# Patient Record
Sex: MALE | Race: WHITE | NOT HISPANIC OR LATINO | Employment: OTHER | URBAN - NONMETROPOLITAN AREA
[De-identification: names, ages, dates, MRNs, and addresses within clinical notes are randomized per-mention and may not be internally consistent; named-entity substitution may affect disease eponyms.]

---

## 2019-01-21 ENCOUNTER — TRANSCRIBE ORDERS (OUTPATIENT)
Dept: ADMINISTRATIVE | Facility: HOSPITAL | Age: 59
End: 2019-01-21

## 2019-01-21 DIAGNOSIS — M54.5 LOW BACK PAIN, UNSPECIFIED BACK PAIN LATERALITY, UNSPECIFIED CHRONICITY, WITH SCIATICA PRESENCE UNSPECIFIED: Primary | ICD-10-CM

## 2019-01-24 ENCOUNTER — HOSPITAL ENCOUNTER (OUTPATIENT)
Dept: MRI IMAGING | Facility: HOSPITAL | Age: 59
Discharge: HOME | End: 2019-01-24

## 2019-01-24 DIAGNOSIS — M54.5 LOW BACK PAIN, UNSPECIFIED BACK PAIN LATERALITY, UNSPECIFIED CHRONICITY, WITH SCIATICA PRESENCE UNSPECIFIED: ICD-10-CM

## 2019-12-11 ENCOUNTER — OFFICE VISIT (OUTPATIENT)
Dept: SURGERY | Facility: CLINIC | Age: 59
End: 2019-12-11

## 2019-12-11 VITALS
BODY MASS INDEX: 39.68 KG/M2 | HEART RATE: 75 BPM | SYSTOLIC BLOOD PRESSURE: 193 MMHG | WEIGHT: 293 LBS | DIASTOLIC BLOOD PRESSURE: 107 MMHG | HEIGHT: 72 IN

## 2019-12-11 DIAGNOSIS — K40.20 NON-RECURRENT BILATERAL INGUINAL HERNIA WITHOUT OBSTRUCTION OR GANGRENE: Primary | ICD-10-CM

## 2019-12-11 PROBLEM — K40.90 INGUINAL HERNIA WITHOUT OBSTRUCTION OR GANGRENE: Status: ACTIVE | Noted: 2019-12-11

## 2019-12-11 PROCEDURE — 99203 OFFICE O/P NEW LOW 30 MIN: CPT | Performed by: SURGERY

## 2019-12-11 RX ORDER — VALSARTAN AND HYDROCHLOROTHIAZIDE 320; 25 MG/1; MG/1
TABLET, FILM COATED ORAL
Refills: 2 | COMMUNITY
Start: 2019-10-01

## 2019-12-11 NOTE — PROGRESS NOTES
Subjective   Cornell Olivarez is a 59 y.o. male is being seen for consultation today at the request of Jose Pettit MD    Cornell Olivarez is a 59 y.o. male Presenting with fullness in the right inguinal canal and pressure.  No obstructive symptoms.  He has a reducible right inguinal hernia at this location.  No previous hernia repair.  No previous surgery.  Left inguinal hernia present as well but smaller and asymptomatic.  Body mass index is 39.7.      History reviewed. No pertinent past medical history.    Family History   Problem Relation Age of Onset   • Cancer Mother        Social History     Socioeconomic History   • Marital status:      Spouse name: Not on file   • Number of children: Not on file   • Years of education: Not on file   • Highest education level: Not on file   Tobacco Use   • Smoking status: Never Smoker   • Smokeless tobacco: Never Used   Substance and Sexual Activity   • Alcohol use: No   • Drug use: No       History reviewed. No pertinent surgical history.    Review of Systems   Constitutional: Negative for activity change, appetite change, chills and fever.   HENT: Negative for sore throat and trouble swallowing.    Eyes: Negative for visual disturbance.   Respiratory: Negative for cough and shortness of breath.    Cardiovascular: Negative for chest pain and palpitations.   Gastrointestinal: Negative for abdominal distention, abdominal pain, blood in stool, constipation, diarrhea, nausea and vomiting.   Endocrine: Negative for cold intolerance and heat intolerance.   Genitourinary: Negative for dysuria.   Musculoskeletal: Negative for joint swelling.   Skin: Negative for color change, rash and wound.   Allergic/Immunologic: Negative for immunocompromised state.   Neurological: Negative for dizziness, seizures, weakness and headaches.   Hematological: Negative for adenopathy. Does not bruise/bleed easily.   Psychiatric/Behavioral: Negative for agitation and confusion.         BP (!)  "193/107   Pulse 75   Ht 182.9 cm (72.01\")   Wt 133 kg (293 lb)   BMI 39.73 kg/m²   Objective   Physical Exam   Constitutional: He is oriented to person, place, and time. He appears well-developed.   HENT:   Head: Normocephalic and atraumatic.   Mouth/Throat: Mucous membranes are normal.   Eyes: Pupils are equal, round, and reactive to light. Conjunctivae are normal.   Neck: Neck supple. No JVD present. No tracheal deviation present. No thyromegaly present.   Cardiovascular: Normal rate and regular rhythm. Exam reveals no gallop and no friction rub.   No murmur heard.  Pulmonary/Chest: Effort normal and breath sounds normal.   Abdominal: Soft. He exhibits no distension. There is no splenomegaly or hepatomegaly. There is no tenderness. A hernia is present.   Right inguinal hernia, left inguinal hernia   Musculoskeletal: Normal range of motion. He exhibits no deformity.   Neurological: He is alert and oriented to person, place, and time.   Skin: Skin is warm and dry.   Psychiatric: He has a normal mood and affect.             Assessment   Cornell was seen today for hernia.    Diagnoses and all orders for this visit:    Non-recurrent bilateral inguinal hernia without obstruction or gangrene      Cornell Olivarez is a 59 y.o. male with full bilateral inguinal hernia right greater than left.  The risk and benefits of surgery have been discussed with the patient and he would like to perform surgery at a time when he can better accommodate the lifting restriction.  Given the reducible nature of his hernias watchful waiting is an appropriate mode of therapy.  The patient has been counseled that his current body mass index of 39.7 while below 40 is the cutoff for surgery weight loss has been recommended.  If he has a BMI over 40 elective repair will not be performed.    Patient's Body mass index is 39.73 kg/m². BMI is above normal parameters. Recommendations include: educational material.             "

## 2022-08-30 ENCOUNTER — OFFICE VISIT (OUTPATIENT)
Dept: SURGERY | Facility: CLINIC | Age: 62
End: 2022-08-30

## 2022-08-30 VITALS — HEIGHT: 72 IN | BODY MASS INDEX: 39.68 KG/M2 | WEIGHT: 293 LBS

## 2022-08-30 DIAGNOSIS — K40.90 INGUINAL HERNIA WITHOUT OBSTRUCTION OR GANGRENE, RECURRENCE NOT SPECIFIED, UNSPECIFIED LATERALITY: Primary | ICD-10-CM

## 2022-08-30 PROCEDURE — 99213 OFFICE O/P EST LOW 20 MIN: CPT | Performed by: SURGERY

## 2022-08-31 NOTE — PROGRESS NOTES
"Subjective   Cornell Olivarez is a 62 y.o. male is being seen for consultation today at the request of Jose Pettit MD    Cornell Olivarez is a 62 y.o. male With unilateral inguinal hernia causing discomfort.  Symptoms are greatest on the left.  Possible bilateral hernias noted last examination.  No anticoagulation reported.  No previous abdominal surgery.  Body mass index 39.7        History reviewed. No pertinent past medical history.    Family History   Problem Relation Age of Onset   • Cancer Mother        Social History     Socioeconomic History   • Marital status:    Tobacco Use   • Smoking status: Never Smoker   • Smokeless tobacco: Never Used   Vaping Use   • Vaping Use: Never used   Substance and Sexual Activity   • Alcohol use: No   • Drug use: No   • Sexual activity: Defer       History reviewed. No pertinent surgical history.    Review of Systems   Constitutional: Negative for activity change, appetite change, chills and fever.   HENT: Negative for sore throat and trouble swallowing.    Eyes: Negative for visual disturbance.   Respiratory: Negative for cough and shortness of breath.    Cardiovascular: Negative for chest pain and palpitations.   Gastrointestinal: Negative for abdominal distention, abdominal pain, blood in stool, constipation, diarrhea, nausea and vomiting.   Endocrine: Negative for cold intolerance and heat intolerance.   Genitourinary: Negative for dysuria.   Musculoskeletal: Negative for joint swelling.   Skin: Negative for color change, rash and wound.   Allergic/Immunologic: Negative for immunocompromised state.   Neurological: Negative for dizziness, seizures, weakness and headaches.   Hematological: Negative for adenopathy. Does not bruise/bleed easily.   Psychiatric/Behavioral: Negative for agitation and confusion.         Ht 182.9 cm (72.01\")   Wt 133 kg (293 lb)   BMI 39.73 kg/m²   Objective   Physical Exam  Constitutional:       Appearance: He is well-developed. "   HENT:      Head: Normocephalic and atraumatic.   Eyes:      Conjunctiva/sclera: Conjunctivae normal.      Pupils: Pupils are equal, round, and reactive to light.   Neck:      Thyroid: No thyromegaly.      Vascular: No JVD.      Trachea: No tracheal deviation.   Cardiovascular:      Rate and Rhythm: Normal rate and regular rhythm.      Heart sounds: No murmur heard.    No friction rub. No gallop.   Pulmonary:      Effort: Pulmonary effort is normal.      Breath sounds: Normal breath sounds.   Abdominal:      General: There is no distension.      Palpations: Abdomen is soft. There is no hepatomegaly or splenomegaly.      Tenderness: There is no abdominal tenderness.      Hernia: A hernia is present.   Musculoskeletal:         General: No deformity. Normal range of motion.      Cervical back: Neck supple.   Skin:     General: Skin is warm and dry.   Neurological:      Mental Status: He is alert and oriented to person, place, and time.               Assessment   Diagnoses and all orders for this visit:    1. Inguinal hernia without obstruction or gangrene, recurrence not specified, unspecified laterality (Primary)  -     Case Request; Standing  -     Case Request    Other orders  -     Follow anesthesia standing orders.  -     Provide NPO Instructions to Patient; Future  -     Chlorhexidine Skin Prep; Future      Cornell Olivarez is a 62 y.o. male with symptomatic unilateral inguinal hernia possible bilateral.  Risks and benefits discussed with the patient he will proceed with robotic inguinal hernia repair.    Class 2 Severe Obesity (BMI >=35 and <=39.9). Obesity-related health conditions include the following: none. Obesity is unchanged. BMI is is above average; BMI management plan is completed. We discussed portion control and increasing exercise.

## 2022-10-04 ENCOUNTER — TELEPHONE (OUTPATIENT)
Dept: SURGERY | Facility: CLINIC | Age: 62
End: 2022-10-04

## 2022-10-04 NOTE — TELEPHONE ENCOUNTER
Left message for patient to return my call about his upcoming surgery with .     DL 10/4/22 11:16am

## 2022-10-06 ENCOUNTER — TELEPHONE (OUTPATIENT)
Dept: SURGERY | Facility: CLINIC | Age: 62
End: 2022-10-06

## 2022-10-06 NOTE — TELEPHONE ENCOUNTER
Pre-op Date/Time: 10/11 @12:15    Surgery Date/Time: 10/13 @ 6:30       NPO AFTER MIDNIGHT AND MUST HAVE A  WITH THE MORNING Of       DL 10/6/22 10:25am

## 2022-10-10 NOTE — DISCHARGE INSTRUCTIONS

## 2022-10-11 ENCOUNTER — PRE-ADMISSION TESTING (OUTPATIENT)
Dept: PREADMISSION TESTING | Facility: HOSPITAL | Age: 62
End: 2022-10-11

## 2022-10-11 LAB
ANION GAP SERPL CALCULATED.3IONS-SCNC: 11.8 MMOL/L (ref 5–15)
BUN SERPL-MCNC: 16 MG/DL (ref 8–23)
BUN/CREAT SERPL: 14 (ref 7–25)
CALCIUM SPEC-SCNC: 9.5 MG/DL (ref 8.6–10.5)
CHLORIDE SERPL-SCNC: 102 MMOL/L (ref 98–107)
CO2 SERPL-SCNC: 26.2 MMOL/L (ref 22–29)
CREAT SERPL-MCNC: 1.14 MG/DL (ref 0.76–1.27)
DEPRECATED RDW RBC AUTO: 43.3 FL (ref 37–54)
EGFRCR SERPLBLD CKD-EPI 2021: 72.7 ML/MIN/1.73
ERYTHROCYTE [DISTWIDTH] IN BLOOD BY AUTOMATED COUNT: 12.5 % (ref 12.3–15.4)
GLUCOSE SERPL-MCNC: 110 MG/DL (ref 65–99)
HCT VFR BLD AUTO: 43.9 % (ref 37.5–51)
HGB BLD-MCNC: 14.6 G/DL (ref 13–17.7)
MCH RBC QN AUTO: 31.3 PG (ref 26.6–33)
MCHC RBC AUTO-ENTMCNC: 33.3 G/DL (ref 31.5–35.7)
MCV RBC AUTO: 94.2 FL (ref 79–97)
PLATELET # BLD AUTO: 230 10*3/MM3 (ref 140–450)
PMV BLD AUTO: 11.1 FL (ref 6–12)
POTASSIUM SERPL-SCNC: 4 MMOL/L (ref 3.5–5.2)
RBC # BLD AUTO: 4.66 10*6/MM3 (ref 4.14–5.8)
SODIUM SERPL-SCNC: 140 MMOL/L (ref 136–145)
WBC NRBC COR # BLD: 7.7 10*3/MM3 (ref 3.4–10.8)

## 2022-10-11 PROCEDURE — 93010 ELECTROCARDIOGRAM REPORT: CPT | Performed by: INTERNAL MEDICINE

## 2022-10-11 PROCEDURE — 93005 ELECTROCARDIOGRAM TRACING: CPT

## 2022-10-11 PROCEDURE — 80048 BASIC METABOLIC PNL TOTAL CA: CPT

## 2022-10-11 PROCEDURE — 36415 COLL VENOUS BLD VENIPUNCTURE: CPT

## 2022-10-11 PROCEDURE — 85027 COMPLETE CBC AUTOMATED: CPT

## 2022-10-12 LAB
QT INTERVAL: 406 MS
QTC INTERVAL: 425 MS

## 2022-10-13 ENCOUNTER — ANESTHESIA EVENT (OUTPATIENT)
Dept: PERIOP | Facility: HOSPITAL | Age: 62
End: 2022-10-13

## 2022-10-13 ENCOUNTER — ANESTHESIA (OUTPATIENT)
Dept: PERIOP | Facility: HOSPITAL | Age: 62
End: 2022-10-13

## 2022-10-13 ENCOUNTER — HOSPITAL ENCOUNTER (OUTPATIENT)
Facility: HOSPITAL | Age: 62
Setting detail: HOSPITAL OUTPATIENT SURGERY
Discharge: HOME | End: 2022-10-13
Attending: SURGERY | Admitting: SURGERY

## 2022-10-13 VITALS
HEART RATE: 70 BPM | DIASTOLIC BLOOD PRESSURE: 66 MMHG | WEIGHT: 278 LBS | HEIGHT: 72 IN | SYSTOLIC BLOOD PRESSURE: 120 MMHG | BODY MASS INDEX: 37.65 KG/M2 | TEMPERATURE: 97.8 F | RESPIRATION RATE: 18 BRPM | OXYGEN SATURATION: 95 %

## 2022-10-13 DIAGNOSIS — K40.90 INGUINAL HERNIA WITHOUT OBSTRUCTION OR GANGRENE, RECURRENCE NOT SPECIFIED, UNSPECIFIED LATERALITY: ICD-10-CM

## 2022-10-13 DIAGNOSIS — K40.20 NON-RECURRENT BILATERAL INGUINAL HERNIA WITHOUT OBSTRUCTION OR GANGRENE: Primary | ICD-10-CM

## 2022-10-13 LAB — GLUCOSE BLDC GLUCOMTR-MCNC: 165 MG/DL (ref 70–130)

## 2022-10-13 PROCEDURE — 25010000002 DEXAMETHASONE PER 1 MG: Performed by: ANESTHESIOLOGY

## 2022-10-13 PROCEDURE — S0260 H&P FOR SURGERY: HCPCS | Performed by: SURGERY

## 2022-10-13 PROCEDURE — 25010000002 KETOROLAC TROMETHAMINE PER 15 MG: Performed by: NURSE ANESTHETIST, CERTIFIED REGISTERED

## 2022-10-13 PROCEDURE — 25010000002 NEOSTIGMINE 10 MG/10ML SOLUTION: Performed by: NURSE ANESTHETIST, CERTIFIED REGISTERED

## 2022-10-13 PROCEDURE — 25010000002 BUPRENORPHINE PER 0.1 MG: Performed by: ANESTHESIOLOGY

## 2022-10-13 PROCEDURE — 25010000002 MIDAZOLAM PER 1 MG: Performed by: NURSE ANESTHETIST, CERTIFIED REGISTERED

## 2022-10-13 PROCEDURE — 49650 LAP ING HERNIA REPAIR INIT: CPT | Performed by: SURGERY

## 2022-10-13 PROCEDURE — 25010000002 CEFAZOLIN PER 500 MG: Performed by: SURGERY

## 2022-10-13 PROCEDURE — 25010000002 FENTANYL CITRATE (PF) 50 MCG/ML SOLUTION: Performed by: NURSE ANESTHETIST, CERTIFIED REGISTERED

## 2022-10-13 PROCEDURE — 25010000002 DROPERIDOL PER 5 MG: Performed by: NURSE ANESTHETIST, CERTIFIED REGISTERED

## 2022-10-13 PROCEDURE — 82962 GLUCOSE BLOOD TEST: CPT

## 2022-10-13 PROCEDURE — C1781 MESH (IMPLANTABLE): HCPCS | Performed by: SURGERY

## 2022-10-13 PROCEDURE — 25010000002 ROPIVACAINE PER 1 MG: Performed by: ANESTHESIOLOGY

## 2022-10-13 PROCEDURE — 25010000002 ONDANSETRON PER 1 MG: Performed by: NURSE ANESTHETIST, CERTIFIED REGISTERED

## 2022-10-13 PROCEDURE — 94640 AIRWAY INHALATION TREATMENT: CPT

## 2022-10-13 PROCEDURE — 25010000002 PROPOFOL 200 MG/20ML EMULSION: Performed by: NURSE ANESTHETIST, CERTIFIED REGISTERED

## 2022-10-13 DEVICE — 3DMAX™ MID ANATOMICAL MESH, LARGE, LEFT, 4" X 6", 10 X 16 CM
Type: IMPLANTABLE DEVICE | Site: INGUINAL | Status: FUNCTIONAL
Brand: 3DMAX™ MID ANATOMICAL MESH

## 2022-10-13 DEVICE — ABSORBABLE WOUND CLOSURE DEVICE
Type: IMPLANTABLE DEVICE | Site: INGUINAL | Status: FUNCTIONAL
Brand: V-LOC 90

## 2022-10-13 DEVICE — 3DMAX™ MID ANATOMICAL MESH, LARGE, RIGHT, 4” X 6”, 10 X 16 CM
Type: IMPLANTABLE DEVICE | Site: INGUINAL | Status: FUNCTIONAL
Brand: 3DMAX™ MID ANATOMICAL MESH

## 2022-10-13 RX ORDER — SODIUM CHLORIDE 0.9 % (FLUSH) 0.9 %
10 SYRINGE (ML) INJECTION AS NEEDED
Status: DISCONTINUED | OUTPATIENT
Start: 2022-10-13 | End: 2022-10-13 | Stop reason: HOSPADM

## 2022-10-13 RX ORDER — FENTANYL CITRATE 50 UG/ML
50 INJECTION, SOLUTION INTRAMUSCULAR; INTRAVENOUS
Status: DISCONTINUED | OUTPATIENT
Start: 2022-10-13 | End: 2022-10-13 | Stop reason: HOSPADM

## 2022-10-13 RX ORDER — TRAMADOL HYDROCHLORIDE 50 MG/1
50 TABLET ORAL EVERY 8 HOURS PRN
Qty: 10 TABLET | Refills: 0 | Status: SHIPPED | OUTPATIENT
Start: 2022-10-13

## 2022-10-13 RX ORDER — MIDAZOLAM HYDROCHLORIDE 1 MG/ML
1 INJECTION INTRAMUSCULAR; INTRAVENOUS
Status: DISCONTINUED | OUTPATIENT
Start: 2022-10-13 | End: 2022-10-13 | Stop reason: HOSPADM

## 2022-10-13 RX ORDER — FENTANYL CITRATE 50 UG/ML
INJECTION, SOLUTION INTRAMUSCULAR; INTRAVENOUS AS NEEDED
Status: DISCONTINUED | OUTPATIENT
Start: 2022-10-13 | End: 2022-10-13 | Stop reason: SURG

## 2022-10-13 RX ORDER — SODIUM CHLORIDE, SODIUM LACTATE, POTASSIUM CHLORIDE, CALCIUM CHLORIDE 600; 310; 30; 20 MG/100ML; MG/100ML; MG/100ML; MG/100ML
125 INJECTION, SOLUTION INTRAVENOUS ONCE
Status: COMPLETED | OUTPATIENT
Start: 2022-10-13 | End: 2022-10-13

## 2022-10-13 RX ORDER — SODIUM CHLORIDE 0.9 % (FLUSH) 0.9 %
10 SYRINGE (ML) INJECTION EVERY 12 HOURS SCHEDULED
Status: DISCONTINUED | OUTPATIENT
Start: 2022-10-13 | End: 2022-10-13 | Stop reason: HOSPADM

## 2022-10-13 RX ORDER — ONDANSETRON 2 MG/ML
INJECTION INTRAMUSCULAR; INTRAVENOUS AS NEEDED
Status: DISCONTINUED | OUTPATIENT
Start: 2022-10-13 | End: 2022-10-13 | Stop reason: SURG

## 2022-10-13 RX ORDER — LIDOCAINE HYDROCHLORIDE 20 MG/ML
INJECTION, SOLUTION EPIDURAL; INFILTRATION; INTRACAUDAL; PERINEURAL AS NEEDED
Status: DISCONTINUED | OUTPATIENT
Start: 2022-10-13 | End: 2022-10-13 | Stop reason: SURG

## 2022-10-13 RX ORDER — OXYCODONE HYDROCHLORIDE AND ACETAMINOPHEN 5; 325 MG/1; MG/1
1 TABLET ORAL ONCE AS NEEDED
Status: COMPLETED | OUTPATIENT
Start: 2022-10-13 | End: 2022-10-13

## 2022-10-13 RX ORDER — DROPERIDOL 2.5 MG/ML
0.62 INJECTION, SOLUTION INTRAMUSCULAR; INTRAVENOUS ONCE
Status: COMPLETED | OUTPATIENT
Start: 2022-10-13 | End: 2022-10-13

## 2022-10-13 RX ORDER — NEOSTIGMINE METHYLSULFATE 1 MG/ML
INJECTION, SOLUTION INTRAVENOUS AS NEEDED
Status: DISCONTINUED | OUTPATIENT
Start: 2022-10-13 | End: 2022-10-13 | Stop reason: SURG

## 2022-10-13 RX ORDER — MIDAZOLAM HYDROCHLORIDE 1 MG/ML
INJECTION INTRAMUSCULAR; INTRAVENOUS AS NEEDED
Status: DISCONTINUED | OUTPATIENT
Start: 2022-10-13 | End: 2022-10-13 | Stop reason: SURG

## 2022-10-13 RX ORDER — BUPRENORPHINE HYDROCHLORIDE 0.32 MG/ML
INJECTION INTRAMUSCULAR; INTRAVENOUS
Status: COMPLETED | OUTPATIENT
Start: 2022-10-13 | End: 2022-10-13

## 2022-10-13 RX ORDER — SODIUM CHLORIDE, SODIUM LACTATE, POTASSIUM CHLORIDE, CALCIUM CHLORIDE 600; 310; 30; 20 MG/100ML; MG/100ML; MG/100ML; MG/100ML
INJECTION, SOLUTION INTRAVENOUS CONTINUOUS PRN
Status: DISCONTINUED | OUTPATIENT
Start: 2022-10-13 | End: 2022-10-13 | Stop reason: SURG

## 2022-10-13 RX ORDER — PROPOFOL 10 MG/ML
INJECTION, EMULSION INTRAVENOUS AS NEEDED
Status: DISCONTINUED | OUTPATIENT
Start: 2022-10-13 | End: 2022-10-13 | Stop reason: SURG

## 2022-10-13 RX ORDER — SODIUM CHLORIDE, SODIUM LACTATE, POTASSIUM CHLORIDE, CALCIUM CHLORIDE 600; 310; 30; 20 MG/100ML; MG/100ML; MG/100ML; MG/100ML
100 INJECTION, SOLUTION INTRAVENOUS ONCE AS NEEDED
Status: DISCONTINUED | OUTPATIENT
Start: 2022-10-13 | End: 2022-10-13 | Stop reason: HOSPADM

## 2022-10-13 RX ORDER — VECURONIUM BROMIDE 1 MG/ML
INJECTION, POWDER, LYOPHILIZED, FOR SOLUTION INTRAVENOUS AS NEEDED
Status: DISCONTINUED | OUTPATIENT
Start: 2022-10-13 | End: 2022-10-13 | Stop reason: SURG

## 2022-10-13 RX ORDER — MEPERIDINE HYDROCHLORIDE 25 MG/ML
12.5 INJECTION INTRAMUSCULAR; INTRAVENOUS; SUBCUTANEOUS
Status: DISCONTINUED | OUTPATIENT
Start: 2022-10-13 | End: 2022-10-13 | Stop reason: HOSPADM

## 2022-10-13 RX ORDER — MAGNESIUM HYDROXIDE 1200 MG/15ML
LIQUID ORAL AS NEEDED
Status: DISCONTINUED | OUTPATIENT
Start: 2022-10-13 | End: 2022-10-13 | Stop reason: HOSPADM

## 2022-10-13 RX ORDER — FAMOTIDINE 10 MG/ML
INJECTION, SOLUTION INTRAVENOUS AS NEEDED
Status: DISCONTINUED | OUTPATIENT
Start: 2022-10-13 | End: 2022-10-13 | Stop reason: SURG

## 2022-10-13 RX ORDER — ONDANSETRON 2 MG/ML
4 INJECTION INTRAMUSCULAR; INTRAVENOUS AS NEEDED
Status: DISCONTINUED | OUTPATIENT
Start: 2022-10-13 | End: 2022-10-13 | Stop reason: HOSPADM

## 2022-10-13 RX ORDER — GLYCOPYRROLATE 0.2 MG/ML
INJECTION INTRAMUSCULAR; INTRAVENOUS AS NEEDED
Status: DISCONTINUED | OUTPATIENT
Start: 2022-10-13 | End: 2022-10-13 | Stop reason: SURG

## 2022-10-13 RX ORDER — DEXAMETHASONE SODIUM PHOSPHATE 4 MG/ML
INJECTION, SOLUTION INTRA-ARTICULAR; INTRALESIONAL; INTRAMUSCULAR; INTRAVENOUS; SOFT TISSUE
Status: COMPLETED | OUTPATIENT
Start: 2022-10-13 | End: 2022-10-13

## 2022-10-13 RX ORDER — IPRATROPIUM BROMIDE AND ALBUTEROL SULFATE 2.5; .5 MG/3ML; MG/3ML
3 SOLUTION RESPIRATORY (INHALATION) ONCE AS NEEDED
Status: COMPLETED | OUTPATIENT
Start: 2022-10-13 | End: 2022-10-13

## 2022-10-13 RX ORDER — KETOROLAC TROMETHAMINE 30 MG/ML
30 INJECTION, SOLUTION INTRAMUSCULAR; INTRAVENOUS EVERY 6 HOURS PRN
Status: COMPLETED | OUTPATIENT
Start: 2022-10-13 | End: 2022-10-13

## 2022-10-13 RX ORDER — IBUPROFEN 600 MG/1
600 TABLET ORAL EVERY 6 HOURS PRN
Qty: 30 TABLET | Refills: 0 | Status: SHIPPED | OUTPATIENT
Start: 2022-10-13

## 2022-10-13 RX ORDER — ROPIVACAINE HYDROCHLORIDE 5 MG/ML
INJECTION, SOLUTION EPIDURAL; INFILTRATION; PERINEURAL
Status: COMPLETED | OUTPATIENT
Start: 2022-10-13 | End: 2022-10-13

## 2022-10-13 RX ORDER — ACETAMINOPHEN 325 MG/1
650 TABLET ORAL EVERY 4 HOURS PRN
Qty: 30 TABLET | Refills: 0 | Status: SHIPPED | OUTPATIENT
Start: 2022-10-13

## 2022-10-13 RX ADMIN — BUPRENORPHINE HYDROCHLORIDE 0.3 MG: 0.32 INJECTION INTRAMUSCULAR; INTRAVENOUS at 07:46

## 2022-10-13 RX ADMIN — OXYCODONE HYDROCHLORIDE AND ACETAMINOPHEN 1 TABLET: 5; 325 TABLET ORAL at 10:31

## 2022-10-13 RX ADMIN — GLYCOPYRROLATE 0.4 MG: 0.4 INJECTION INTRAMUSCULAR; INTRAVENOUS at 08:59

## 2022-10-13 RX ADMIN — VECURONIUM BROMIDE 2 MG: 1 INJECTION, POWDER, LYOPHILIZED, FOR SOLUTION INTRAVENOUS at 08:12

## 2022-10-13 RX ADMIN — IPRATROPIUM BROMIDE AND ALBUTEROL SULFATE 3 ML: .5; 3 SOLUTION RESPIRATORY (INHALATION) at 10:09

## 2022-10-13 RX ADMIN — KETOROLAC TROMETHAMINE 30 MG: 30 INJECTION, SOLUTION INTRAMUSCULAR at 09:54

## 2022-10-13 RX ADMIN — SODIUM CHLORIDE, POTASSIUM CHLORIDE, SODIUM LACTATE AND CALCIUM CHLORIDE: 600; 310; 30; 20 INJECTION, SOLUTION INTRAVENOUS at 07:29

## 2022-10-13 RX ADMIN — DEXAMETHASONE SODIUM PHOSPHATE 8 MG: 4 INJECTION, SOLUTION INTRA-ARTICULAR; INTRALESIONAL; INTRAMUSCULAR; INTRAVENOUS; SOFT TISSUE at 07:46

## 2022-10-13 RX ADMIN — SODIUM CHLORIDE, POTASSIUM CHLORIDE, SODIUM LACTATE AND CALCIUM CHLORIDE 125 ML/HR: 600; 310; 30; 20 INJECTION, SOLUTION INTRAVENOUS at 07:06

## 2022-10-13 RX ADMIN — ROPIVACAINE HYDROCHLORIDE 300 MG: 5 INJECTION, SOLUTION EPIDURAL; INFILTRATION; PERINEURAL at 07:46

## 2022-10-13 RX ADMIN — LIDOCAINE HYDROCHLORIDE 40 MG: 20 INJECTION, SOLUTION EPIDURAL; INFILTRATION; INTRACAUDAL; PERINEURAL at 07:34

## 2022-10-13 RX ADMIN — FAMOTIDINE 20 MG: 10 INJECTION, SOLUTION INTRAVENOUS at 07:29

## 2022-10-13 RX ADMIN — FENTANYL CITRATE 100 MCG: 50 INJECTION INTRAMUSCULAR; INTRAVENOUS at 07:34

## 2022-10-13 RX ADMIN — NEOSTIGMINE METHYLSULFATE 3 MG: 0.5 INJECTION INTRAVENOUS at 08:59

## 2022-10-13 RX ADMIN — ONDANSETRON 4 MG: 2 INJECTION INTRAMUSCULAR; INTRAVENOUS at 08:59

## 2022-10-13 RX ADMIN — MIDAZOLAM HYDROCHLORIDE 2 MG: 1 INJECTION, SOLUTION INTRAMUSCULAR; INTRAVENOUS at 07:29

## 2022-10-13 RX ADMIN — VECURONIUM BROMIDE 5 MG: 1 INJECTION, POWDER, LYOPHILIZED, FOR SOLUTION INTRAVENOUS at 07:34

## 2022-10-13 RX ADMIN — DROPERIDOL 0.62 MG: 2.5 INJECTION, SOLUTION INTRAMUSCULAR; INTRAVENOUS at 09:19

## 2022-10-13 RX ADMIN — PROPOFOL 200 MG: 10 INJECTION, EMULSION INTRAVENOUS at 07:34

## 2022-10-13 RX ADMIN — CEFAZOLIN 3 G: 1 INJECTION, POWDER, FOR SOLUTION INTRAVENOUS at 07:39

## 2022-10-13 NOTE — ANESTHESIA POSTPROCEDURE EVALUATION
Patient: Cornell Olivarez    Procedure Summary     Date: 10/13/22 Room / Location: [facility] OR [address] [place] [address] COR OR    Anesthesia Start: 0729 Anesthesia Stop: 0913    Procedure: INGUINAL HERNIA REPAIR LAPAROSCOPIC WITH DAVINCI ROBOT (Bilateral: Abdomen) Diagnosis:       Inguinal hernia without obstruction or gangrene, recurrence not specified, unspecified laterality      (Inguinal hernia without obstruction or gangrene, recurrence not specified, unspecified laterality [K40.90])    Surgeons: Ramiro Fitzpatrick MD Provider: Ildefonso Vlale MD    Anesthesia Type: general with block ASA Status: 3          Anesthesia Type: general with block    Vitals  Vitals Value Taken Time   /58 10/13/22 1019   Temp 97.8 °F (36.6 °C) 10/13/22 0949   Pulse 63 10/13/22 1019   Resp 14 10/13/22 1019   SpO2 94 % 10/13/22 1019           Post Anesthesia Care and Evaluation    Patient location during evaluation: PACU  Patient participation: complete - patient participated  Level of consciousness: awake  Pain score: 0  Pain management: adequate    Airway patency: patent  Anesthetic complications: No anesthetic complications  PONV Status: none  Cardiovascular status: hemodynamically stable  Respiratory status: nasal cannula  Hydration status: acceptable

## 2022-10-13 NOTE — OP NOTE
INGUINAL HERNIA REPAIR LAPAROSCOPIC WITH DAVINCI ROBOT  Procedure Note    Cornell Olivarez  10/13/2022    Pre-op Diagnosis:   Inguinal hernia without obstruction or gangrene, recurrence not specified, unspecified laterality [K40.90]    Post-op Diagnosis: Bilateral inguinal hernia    Indications: See above    Procedure(s):  INGUINAL HERNIA REPAIR LAPAROSCOPIC WITH DAVINCI ROBOT    Surgeon(s):  Ramiro Fitzpatrick MD    Anesthesia: General    Staff:   Circulator: Mendoza Villafana RN  Scrub Person: Jim Waller  Assistant: Tawanda Lantigua    Findings:   Right direct inguinal hernia and cord lipoma  Left indirect inguinal hernia with cord lipoma  Bilateral large 3D max mesh preperitoneal placement    Operative Procedure: The patient was taken to operating suite and placed supine on the operating table.  Bilateral sequential compression devices were applied and general endotracheal anesthesia administered.  A Oliver catheter was inserted.  The abdomen was prepped and draped in usual sterile fashion.  Preoperative antibiotics were confirmed.  Timeout procedure was performed.  A Veress needle was inserted at Horton's point. Saline drop test confirmed entry into the peritoneal space.  Pneumoperitoneum was established.  At this time a supraumbilical optical trocar was inserted in the midline.  Laparoscopic survey showed evidence of bilateral inguinal hernia with indirect hernia on the right and an indirect hernia on the left.  The optical trocar was then converted to a 8 mm robotic trocar.  In the anterior axillary line bilaterally at the level of the supraumbilical trocar 8 mm robotic trocars were placed.  At this time the robot was docked and the laparoscope was inserted.  After determining target anatomy the robot was targeted and the fenestrated bipolar grasper and scissors were placed into the abdominal space under visualization.  With all instruments docked I then exited the sterile field and entered the  robotic console.  Beginning on the right a peritoneal flap was created from the anterior superior iliac spine to the medial umbilical vein.  The peritoneal flap was then dissected free with blunt and sharp dissection inferiorly to the pubic tubercle.  The pubic tubercle was exposed across the midline.  Dissection was carried laterally along the myopectineal orifice exposing the entirety of the myopectineal orifice with no evidence of femoral hernia.  Laterally the space was created to allow adequate space for mesh placement.  At this time the cord structures were identified and dissected free circumferentially from the herniated peritoneum.  At all hernia defects the peritoneum was dissected free and removed from the hernia defects and reflected back off the cord structures to allow adequate space for mesh placement.  Inspection for cord lipomas was made and a cord lipoma was removed from the right inguinal canal.  Following completion of the dissection attention was turned to contralateral dissection.  In a similar fashion a peritoneal flap was made from the anterior superior iliac spine to the medial umbilical vein.  The flap was carried down inferiorly with blunt and sharp dissection to the pubic tubercle.  The contralateral space was entered crossing the midline.  Dissection was carried out laterally to allow adequate space for mesh placement.  The cord structures were identified with care to preserve the vas deferens and this was dissected free from the herniated peritoneal tissue.  At all hernia defects, the herniated peritoneum was dissected free from the defects with no evidence of a femoral hernia.  The herniated peritoneum was then reflected posteriorly to allow adequate space for mesh placement.  A left cord lipoma was removed.  At the completion of the dissection the myopectineal orifice was completely visualized with no evidence of femoral hernia and at this time bilateral mesh placement began.  A large  3DMax mesh was placed first on the right hernia space and anchored to the Vlad's ligament medially with a Vicryl suture.  The mesh covered all hernia defects with no evidence of infolding with approximation of the peritoneal flap.  The peritoneal flap was then closed with a running 2-0 V-loc suture with no evidence of peritoneal tears or exposure of the mesh.  The left-sided 3DMax mesh was placed in a similar fashion into the peritoneal pocket and secured to the Vlad's ligament with a Vicryl suture medially.  The mesh covered all hernia defects with no evidence of folding with approximation of the peritoneal flap.  The peritoneal flap was then approximated with a running 2-0 V-Loc suture with no evidence of peritoneal tear or exposed mesh.  At this time all needles were removed by my assistant and I exited the robotic console and entered the operative field.  The robot was undocked and removed and at this time all trocars were removed under direct visualization and pneumoperitoneum was evacuated.  All sponge lap and needle counts were correct.  The supraumbilical fascial defect was closed with a figure-of-eight Vicryl suture and all skin incisions closed with Monocryl subcuticular suture and dressed with SureClose.  The patient's Oliver catheter was removed and the bilateral testes were reduced into the scrotum.  Patient tolerated the procedure well.    Estimated Blood Loss: 10 mL    Specimens: None                   Drains: None    Grafts or Implants: Bilateral preperitoneal large 3D max inguinal mesh    Complications: None      Ramiro Fitzpatrick MD     Date: 10/13/2022  Time: 09:06 EDT

## 2022-10-13 NOTE — H&P
"Subjective      Cornell Olivarez is a 62 y.o. male is being seen for consultation today at the request of Jose Pettit MD     Cornell Olivarez is a 62 y.o. male With unilateral inguinal hernia causing discomfort.  Symptoms are greatest on the left.  Possible bilateral hernias noted last examination.  No anticoagulation reported.  No previous abdominal surgery.  Body mass index 39.7           Medical History   History reviewed. No pertinent past medical history.              Family History   Problem Relation Age of Onset   • Cancer Mother           Social History   Social History           Socioeconomic History   • Marital status:    Tobacco Use   • Smoking status: Never Smoker   • Smokeless tobacco: Never Used   Vaping Use   • Vaping Use: Never used   Substance and Sexual Activity   • Alcohol use: No   • Drug use: No   • Sexual activity: Defer            Surgical History   History reviewed. No pertinent surgical history.        Review of Systems   Constitutional: Negative for activity change, appetite change, chills and fever.   HENT: Negative for sore throat and trouble swallowing.    Eyes: Negative for visual disturbance.   Respiratory: Negative for cough and shortness of breath.    Cardiovascular: Negative for chest pain and palpitations.   Gastrointestinal: Negative for abdominal distention, abdominal pain, blood in stool, constipation, diarrhea, nausea and vomiting.   Endocrine: Negative for cold intolerance and heat intolerance.   Genitourinary: Negative for dysuria.   Musculoskeletal: Negative for joint swelling.   Skin: Negative for color change, rash and wound.   Allergic/Immunologic: Negative for immunocompromised state.   Neurological: Negative for dizziness, seizures, weakness and headaches.   Hematological: Negative for adenopathy. Does not bruise/bleed easily.   Psychiatric/Behavioral: Negative for agitation and confusion.            Ht 182.9 cm (72.01\")   Wt 133 kg (293 lb)   BMI 39.73 " kg/m²         Objective      Physical Exam  Constitutional:       Appearance: He is well-developed.   HENT:      Head: Normocephalic and atraumatic.   Eyes:      Conjunctiva/sclera: Conjunctivae normal.      Pupils: Pupils are equal, round, and reactive to light.   Neck:      Thyroid: No thyromegaly.      Vascular: No JVD.      Trachea: No tracheal deviation.   Cardiovascular:      Rate and Rhythm: Normal rate and regular rhythm.      Heart sounds: No murmur heard.    No friction rub. No gallop.   Pulmonary:      Effort: Pulmonary effort is normal.      Breath sounds: Normal breath sounds.   Abdominal:      General: There is no distension.      Palpations: Abdomen is soft. There is no hepatomegaly or splenomegaly.      Tenderness: There is no abdominal tenderness.      Hernia: A hernia is present.   Musculoskeletal:         General: No deformity. Normal range of motion.      Cervical back: Neck supple.   Skin:     General: Skin is warm and dry.   Neurological:      Mental Status: He is alert and oriented to person, place, and time.                         Assessment      Diagnoses and all orders for this visit:     1. Inguinal hernia without obstruction or gangrene, recurrence not specified, unspecified laterality (Primary)  -     Case Request; Standing  -     Case Request     Other orders  -     Follow anesthesia standing orders.  -     Provide NPO Instructions to Patient; Future  -     Chlorhexidine Skin Prep; Future        Cornell Olivarez is a 62 y.o. male with symptomatic unilateral inguinal hernia possible bilateral.  Risks and benefits discussed with the patient he will proceed with robotic inguinal hernia repair.     Class 2 Severe Obesity (BMI >=35 and <=39.9). Obesity-related health conditions include the following: none. Obesity is unchanged. BMI is is above average; BMI management plan is completed. We discussed portion control and increasing exercise.

## 2022-10-13 NOTE — ANESTHESIA PROCEDURE NOTES
"Peripheral Block      Patient reassessed immediately prior to procedure    Patient location during procedure: OR  Start time: 10/13/2022 7:45 AM  Stop time: 10/13/2022 7:48 AM  Reason for block: at surgeon's request and post-op pain management  Performed by  JEFF/CAA: Marie Juares CRNA  Preanesthetic Checklist  Completed: patient identified, IV checked, site marked, risks and benefits discussed, surgical consent, monitors and equipment checked, pre-op evaluation and timeout performed  Prep:  Pt Position: supine  Sterile barriers:cap, gloves, sterile barriers and mask  Prep: ChloraPrep  Patient monitoring: blood pressure monitoring, continuous pulse oximetry and EKG  Procedure    Nursing cardiac assessment comments yes: Sedation, GA, Spinal,Epidural   Performed under: general  Guidance:ultrasound guided    ULTRASOUND INTERPRETATION.  Using ultrasound guidance a 20 G (20g 4\" Stimuplex) gauge needle was placed in close proximity to the nerve, at which point, under ultrasound guidance anesthetic was injected in the area of the nerve and spread of the anesthesia was seen on ultrasound in close proximity thereto.  There were no abnormalities seen on ultrasound; a digital image was taken; and the patient tolerated the procedure with no complications. Images:still images obtained    Laterality:Bilateral  Block Type:TAP  Injection Technique:single-shot  Needle Type:short-bevel  Needle Gauge:20 G  Resistance on Injection: none    Medications Used: ropivacaine (NAROPIN) injection 0.5 % - Peripheral Nerve   300 mg - 10/13/2022 7:46:00 AM  dexamethasone (DECADRON) injection - Injection   8 mg - 10/13/2022 7:46:00 AM  buprenorphine (BUPRENEX) injection - Injection   0.3 mg - 10/13/2022 7:46:00 AM      Medications  Comment:Block Injection:  Total volume divided equally between all 4 injection sites      Post Assessment  Injection Assessment: negative aspiration for heme, incremental injection and no paresthesia on " injection  Patient Tolerance:comfortable throughout block  Complications:no  Additional Notes  The pt was in the supine position under general anesthesia    Under Ultrasound guidance, a BBraun 4inch 360 degree needle was advanced with Normal Saline hydro dissection of tissue.  The Internal Oblique and Transversus Abdominus muscles where visualized.  At or before the aponeurosis of Internal Oblique, local anesthetic spread was visualized in the Transversus Abdominus Plane. Injection was made incrementally with aspiration every 5 mls.  There was no  intravascular injection,  injection pressure was normal, there was no neural injection, and the procedure was completed without difficulty. The same procedure was completed for left and right sided lateral tap blocks.    Under Ultrasound guidance, a Lakhani 4inch 360 degree needle was advanced with Normal Saline hydro dissection of tissue.  The Rectus and Transversus Abdominus muscles where visualized.  The needle tip was placed between the Transversus Abdominus and rectus abdominus, local anesthetic spread was visualized in the Transversus Abdominus Plane. Injection was made incrementally with aspiration every 5 mls.  There was no  intravascular injection,  injection pressure was normal, there was no neural injection, and the procedure was completed without difficulty. The same procedure was completed for left and right sided subcostal tap blocks. Thank You.

## 2022-10-13 NOTE — ANESTHESIA PROCEDURE NOTES
Airway  Urgency: elective      General Information and Staff    Patient location during procedure: OR    Indications and Patient Condition  Indications for airway management: airway protection    Preoxygenated: yes  Mask difficulty assessment: 2 - vent by mask + OA or adjuvant +/- NMBA    Final Airway Details  Final airway type: endotracheal airway      Successful airway: ETT  Cuffed: yes   Successful intubation technique: direct laryngoscopy and video laryngoscopy  Facilitating devices/methods: intubating stylet  Endotracheal tube insertion site: oral  Blade: Glidescope  Blade size: 3  ETT size (mm): 7.5  Cormack-Lehane Classification: grade I - full view of glottis  Placement verified by: chest auscultation, capnometry and palpation of cuff   Measured from: lips  ETT/EBT  to lips (cm): 22  Number of attempts at approach: 1  Assessment: lips, teeth, and gum same as pre-op and atraumatic intubation    Additional Comments  DL with MAC 3 view of epiglottis only and unable to visualize cords, glidescope 3 used  goos visual of cords 7.5 ETT placed with ease

## 2022-10-13 NOTE — ANESTHESIA PREPROCEDURE EVALUATION
Anesthesia Evaluation     Patient summary reviewed and Nursing notes reviewed   NPO Solid Status: > 8 hours  NPO Liquid Status: > 8 hours           Airway   Mallampati: I  TM distance: >3 FB  Neck ROM: full  Dental - normal exam     Pulmonary - normal exam   (+) sleep apnea on CPAP,   Cardiovascular   Exercise tolerance: good (4-7 METS)    Rhythm: regular  Rate: normal    (+) hypertension,       Neuro/Psych- negative ROS  GI/Hepatic/Renal/Endo - negative ROS     Musculoskeletal     Abdominal     Abdomen: soft.   Substance History - negative use     OB/GYN          Other   arthritis,                      Anesthesia Plan    ASA 3     general with block     intravenous induction     Anesthetic plan, risks, benefits, and alternatives have been provided, discussed and informed consent has been obtained with: patient.    Use of blood products discussed with consented to blood products.   Plan discussed with CRNA.        CODE STATUS:

## 2022-10-26 ENCOUNTER — OFFICE VISIT (OUTPATIENT)
Dept: SURGERY | Facility: CLINIC | Age: 62
End: 2022-10-26

## 2022-10-26 VITALS — HEIGHT: 72 IN | BODY MASS INDEX: 37.65 KG/M2 | WEIGHT: 278 LBS

## 2022-10-26 DIAGNOSIS — K40.20 NON-RECURRENT BILATERAL INGUINAL HERNIA WITHOUT OBSTRUCTION OR GANGRENE: Primary | ICD-10-CM

## 2022-10-26 PROCEDURE — 99024 POSTOP FOLLOW-UP VISIT: CPT | Performed by: SURGERY

## 2022-10-26 NOTE — PROGRESS NOTES
Subjective   Cornell Olivarez is a 62 y.o. male  is here today for follow-up.         Cornell Olivarez is a 62 y.o. male here for follow up after bilateral robotic inguinal hernia repair with mesh.  The patient is doing well without complication or abnormality.  He is voiding of urine without difficulty and hence his incisions are healing well.    Assessment     Diagnoses and all orders for this visit:    1. Non-recurrent bilateral inguinal hernia without obstruction or gangrene (Primary)      Cornell Olivarez is a 62 y.o. male doing well after bilateral inguinal hernia repair with mesh.  No complications or abnormalities noted.  No patient complaints.  Continue full 6-week lifting restriction from the date of surgery and follow-up as needed.

## 2023-04-28 ENCOUNTER — HOSPITAL ENCOUNTER (EMERGENCY)
Facility: HOSPITAL | Age: 63
Discharge: HOME | End: 2023-04-29
Attending: STUDENT IN AN ORGANIZED HEALTH CARE EDUCATION/TRAINING PROGRAM
Payer: COMMERCIAL

## 2023-04-28 ENCOUNTER — APPOINTMENT (OUTPATIENT)
Dept: GENERAL RADIOLOGY | Facility: HOSPITAL | Age: 63
End: 2023-04-28
Payer: COMMERCIAL

## 2023-04-28 ENCOUNTER — APPOINTMENT (OUTPATIENT)
Dept: CT IMAGING | Facility: HOSPITAL | Age: 63
End: 2023-04-28
Payer: COMMERCIAL

## 2023-04-28 DIAGNOSIS — K57.00: Primary | ICD-10-CM

## 2023-04-28 LAB
ALBUMIN SERPL-MCNC: 4.2 G/DL (ref 3.5–5.2)
ALBUMIN/GLOB SERPL: 1.2 G/DL
ALP SERPL-CCNC: 115 U/L (ref 39–117)
ALT SERPL W P-5'-P-CCNC: 80 U/L (ref 1–41)
ANION GAP SERPL CALCULATED.3IONS-SCNC: 14.9 MMOL/L (ref 5–15)
AST SERPL-CCNC: 26 U/L (ref 1–40)
BASOPHILS # BLD AUTO: 0.04 10*3/MM3 (ref 0–0.2)
BASOPHILS NFR BLD AUTO: 0.3 % (ref 0–1.5)
BILIRUB SERPL-MCNC: 0.8 MG/DL (ref 0–1.2)
BUN SERPL-MCNC: 34 MG/DL (ref 8–23)
BUN/CREAT SERPL: 25.6 (ref 7–25)
CALCIUM SPEC-SCNC: 9 MG/DL (ref 8.6–10.5)
CHLORIDE SERPL-SCNC: 103 MMOL/L (ref 98–107)
CO2 SERPL-SCNC: 22.1 MMOL/L (ref 22–29)
CREAT SERPL-MCNC: 1.33 MG/DL (ref 0.76–1.27)
D-LACTATE SERPL-SCNC: 2.2 MMOL/L (ref 0.5–2)
DEPRECATED RDW RBC AUTO: 42.9 FL (ref 37–54)
EGFRCR SERPLBLD CKD-EPI 2021: 60.1 ML/MIN/1.73
EOSINOPHIL # BLD AUTO: 0.12 10*3/MM3 (ref 0–0.4)
EOSINOPHIL NFR BLD AUTO: 0.9 % (ref 0.3–6.2)
ERYTHROCYTE [DISTWIDTH] IN BLOOD BY AUTOMATED COUNT: 12.5 % (ref 12.3–15.4)
GLOBULIN UR ELPH-MCNC: 3.6 GM/DL
GLUCOSE SERPL-MCNC: 109 MG/DL (ref 65–99)
HCT VFR BLD AUTO: 47.9 % (ref 37.5–51)
HGB BLD-MCNC: 16.3 G/DL (ref 13–17.7)
HOLD SPECIMEN: NORMAL
HOLD SPECIMEN: NORMAL
IMM GRANULOCYTES # BLD AUTO: 0.04 10*3/MM3 (ref 0–0.05)
IMM GRANULOCYTES NFR BLD AUTO: 0.3 % (ref 0–0.5)
INR PPP: 1.13 (ref 0.9–1.1)
LIPASE SERPL-CCNC: 40 U/L (ref 13–60)
LYMPHOCYTES # BLD AUTO: 1.61 10*3/MM3 (ref 0.7–3.1)
LYMPHOCYTES NFR BLD AUTO: 11.9 % (ref 19.6–45.3)
MCH RBC QN AUTO: 31.7 PG (ref 26.6–33)
MCHC RBC AUTO-ENTMCNC: 34 G/DL (ref 31.5–35.7)
MCV RBC AUTO: 93.2 FL (ref 79–97)
MONOCYTES # BLD AUTO: 1.09 10*3/MM3 (ref 0.1–0.9)
MONOCYTES NFR BLD AUTO: 8.1 % (ref 5–12)
NEUTROPHILS NFR BLD AUTO: 10.6 10*3/MM3 (ref 1.7–7)
NEUTROPHILS NFR BLD AUTO: 78.5 % (ref 42.7–76)
NRBC BLD AUTO-RTO: 0 /100 WBC (ref 0–0.2)
PLATELET # BLD AUTO: 285 10*3/MM3 (ref 140–450)
PMV BLD AUTO: 10.3 FL (ref 6–12)
POTASSIUM SERPL-SCNC: 3.5 MMOL/L (ref 3.5–5.2)
PROT SERPL-MCNC: 7.8 G/DL (ref 6–8.5)
PROTHROMBIN TIME: 15.1 SECONDS (ref 12.1–14.7)
RBC # BLD AUTO: 5.14 10*6/MM3 (ref 4.14–5.8)
SODIUM SERPL-SCNC: 140 MMOL/L (ref 136–145)
WBC NRBC COR # BLD: 13.5 10*3/MM3 (ref 3.4–10.8)

## 2023-04-28 PROCEDURE — 99283 EMERGENCY DEPT VISIT LOW MDM: CPT

## 2023-04-28 PROCEDURE — 83605 ASSAY OF LACTIC ACID: CPT | Performed by: NURSE PRACTITIONER

## 2023-04-28 PROCEDURE — 74022 RADEX COMPL AQT ABD SERIES: CPT

## 2023-04-28 PROCEDURE — 25010000002 ONDANSETRON PER 1 MG: Performed by: STUDENT IN AN ORGANIZED HEALTH CARE EDUCATION/TRAINING PROGRAM

## 2023-04-28 PROCEDURE — 83690 ASSAY OF LIPASE: CPT | Performed by: NURSE PRACTITIONER

## 2023-04-28 PROCEDURE — 80053 COMPREHEN METABOLIC PANEL: CPT | Performed by: NURSE PRACTITIONER

## 2023-04-28 PROCEDURE — 36415 COLL VENOUS BLD VENIPUNCTURE: CPT

## 2023-04-28 PROCEDURE — 74177 CT ABD & PELVIS W/CONTRAST: CPT

## 2023-04-28 PROCEDURE — 85610 PROTHROMBIN TIME: CPT | Performed by: NURSE PRACTITIONER

## 2023-04-28 PROCEDURE — 96375 TX/PRO/DX INJ NEW DRUG ADDON: CPT

## 2023-04-28 PROCEDURE — 85025 COMPLETE CBC W/AUTO DIFF WBC: CPT | Performed by: NURSE PRACTITIONER

## 2023-04-28 RX ORDER — ONDANSETRON 2 MG/ML
4 INJECTION INTRAMUSCULAR; INTRAVENOUS ONCE
Status: COMPLETED | OUTPATIENT
Start: 2023-04-28 | End: 2023-04-28

## 2023-04-28 RX ORDER — SODIUM CHLORIDE 0.9 % (FLUSH) 0.9 %
10 SYRINGE (ML) INJECTION AS NEEDED
Status: DISCONTINUED | OUTPATIENT
Start: 2023-04-28 | End: 2023-04-29 | Stop reason: HOSPADM

## 2023-04-28 RX ADMIN — SODIUM CHLORIDE 1000 ML: 9 INJECTION, SOLUTION INTRAVENOUS at 23:00

## 2023-04-28 RX ADMIN — ONDANSETRON 4 MG: 2 INJECTION INTRAMUSCULAR; INTRAVENOUS at 23:00

## 2023-04-29 VITALS
WEIGHT: 264 LBS | HEART RATE: 80 BPM | HEIGHT: 72 IN | OXYGEN SATURATION: 99 % | DIASTOLIC BLOOD PRESSURE: 60 MMHG | RESPIRATION RATE: 18 BRPM | BODY MASS INDEX: 35.76 KG/M2 | TEMPERATURE: 97.5 F | SYSTOLIC BLOOD PRESSURE: 110 MMHG

## 2023-04-29 LAB
WHOLE BLOOD HOLD COAG: NORMAL
WHOLE BLOOD HOLD SPECIMEN: NORMAL

## 2023-04-29 PROCEDURE — 25010000002 CEFTRIAXONE PER 250 MG: Performed by: STUDENT IN AN ORGANIZED HEALTH CARE EDUCATION/TRAINING PROGRAM

## 2023-04-29 PROCEDURE — 96376 TX/PRO/DX INJ SAME DRUG ADON: CPT

## 2023-04-29 PROCEDURE — 25510000001 IOPAMIDOL 61 % SOLUTION: Performed by: STUDENT IN AN ORGANIZED HEALTH CARE EDUCATION/TRAINING PROGRAM

## 2023-04-29 PROCEDURE — 96375 TX/PRO/DX INJ NEW DRUG ADDON: CPT

## 2023-04-29 PROCEDURE — 63710000001 ONDANSETRON ODT 4 MG TABLET DISPERSIBLE: Performed by: STUDENT IN AN ORGANIZED HEALTH CARE EDUCATION/TRAINING PROGRAM

## 2023-04-29 PROCEDURE — 25010000002 ONDANSETRON PER 1 MG: Performed by: STUDENT IN AN ORGANIZED HEALTH CARE EDUCATION/TRAINING PROGRAM

## 2023-04-29 PROCEDURE — 96365 THER/PROPH/DIAG IV INF INIT: CPT

## 2023-04-29 RX ORDER — ONDANSETRON 4 MG/1
4 TABLET, ORALLY DISINTEGRATING ORAL EVERY 8 HOURS PRN
Status: DISCONTINUED | OUTPATIENT
Start: 2023-04-29 | End: 2023-04-29 | Stop reason: HOSPADM

## 2023-04-29 RX ORDER — ONDANSETRON 4 MG/1
4 TABLET, ORALLY DISINTEGRATING ORAL EVERY 8 HOURS PRN
Qty: 15 TABLET | Refills: 0 | Status: SHIPPED | OUTPATIENT
Start: 2023-04-29

## 2023-04-29 RX ORDER — METRONIDAZOLE 500 MG/1
500 TABLET ORAL 3 TIMES DAILY
Qty: 21 TABLET | Refills: 0 | Status: SHIPPED | OUTPATIENT
Start: 2023-04-29 | End: 2023-05-06

## 2023-04-29 RX ORDER — CEFDINIR 300 MG/1
300 CAPSULE ORAL 2 TIMES DAILY
Qty: 14 CAPSULE | Refills: 0 | Status: SHIPPED | OUTPATIENT
Start: 2023-04-29 | End: 2023-05-06

## 2023-04-29 RX ORDER — ONDANSETRON 2 MG/ML
4 INJECTION INTRAMUSCULAR; INTRAVENOUS ONCE AS NEEDED
Status: COMPLETED | OUTPATIENT
Start: 2023-04-29 | End: 2023-04-29

## 2023-04-29 RX ORDER — METRONIDAZOLE 500 MG/100ML
500 INJECTION, SOLUTION INTRAVENOUS ONCE
Status: COMPLETED | OUTPATIENT
Start: 2023-04-29 | End: 2023-04-29

## 2023-04-29 RX ADMIN — ONDANSETRON 4 MG: 4 TABLET, ORALLY DISINTEGRATING ORAL at 02:27

## 2023-04-29 RX ADMIN — CEFTRIAXONE 2 G: 2 INJECTION, POWDER, FOR SOLUTION INTRAMUSCULAR; INTRAVENOUS at 01:25

## 2023-04-29 RX ADMIN — IOPAMIDOL 80 ML: 612 INJECTION, SOLUTION INTRAVENOUS at 00:12

## 2023-04-29 RX ADMIN — ONDANSETRON 4 MG: 2 INJECTION INTRAMUSCULAR; INTRAVENOUS at 02:28

## 2023-04-29 RX ADMIN — METRONIDAZOLE 500 MG: 500 INJECTION, SOLUTION INTRAVENOUS at 01:40

## 2023-04-29 NOTE — ED NOTES
MEDICAL SCREENING:    Reason for Visit: Patient is c/o abdominal pain, N/V/D x6 days. He states he was recently diagnosed with Type 2 DM and started Ozempic about 4 weeks ago, and had not had any N/V with that shot until this last one that he took approx 6 days ago. He states his dose has not changed on the Ozempic. He is taking 0.25mg injection.     Patient initially seen in triage. The patient was advised further evaluation and diagnostic testing will be needed, some of the treatment and testing will be initiated in the lobby in order to begin the process. The patient will be returned to the waiting area for the time being and possibly be re-assessed by a subsequent ED provider. The patient will be brought back to the treatment area in as timely manner as possible.        Dorothea Genao, APRN  04/28/23 2870

## 2023-04-29 NOTE — ED PROVIDER NOTES
[facility]  emergency department encounter        Pt Name: Cornell Olivarez  MRN: [id number]  Birthdate 1960  Date of evaluation: 4/29/2023    Chief Complaint   Patient presents with   • Abdominal Pain   • Vomiting   • Nausea   • Diarrhea             HISTORY OF PRESENT ILLNESS:   Cornell Olivarez is a 63 y.o. male PMH HTN, arthritis, sleep apnea, DM, GERD    Patient presents with predominant complaint of diarrhea.  Symptom onset 6 days ago and also associated with mild suprapubic abdominal pain, nausea, vomiting.  Stool and emesis nonbloody nonbilious.  Estimates 4 wet stools today.  Denies fever chest pain respiratory complaints dysuria and ROS otherwise.    Patient was started on Ozempic roughly 4 weeks ago, had remained asymptomatic for the first couple of doses.  Last Ozempic shot was about 6 days ago around time of onset of symptoms.  On 25 mg dose.  Reports 10 pound weight loss since last week.        PCP: Jose Pettit MD          REVIEW OF SYSTEMS:     Review of Systems   Constitutional: Negative for fever.   HENT: Negative for congestion and rhinorrhea.    Eyes: Negative for visual disturbance.   Respiratory: Negative for cough and shortness of breath.    Cardiovascular: Negative for chest pain.   Gastrointestinal: Positive for abdominal pain, diarrhea, nausea and vomiting.   Genitourinary: Negative for dysuria.   Musculoskeletal: Negative for myalgias.   Skin: Negative for rash.   Neurological: Negative for headaches.   Psychiatric/Behavioral: Negative for confusion.       Review of systems otherwise as per HPI.          PREVIOUS HISTORY:         Past Medical History:   Diagnosis Date   • Arthritis    • Hypertension    • Sleep apnea            Past Surgical History:   Procedure Laterality Date   • COLONOSCOPY     • INGUINAL HERNIA REPAIR Bilateral 10/13/2022    Procedure: INGUINAL HERNIA REPAIR LAPAROSCOPIC WITH DAVINCI ROBOT;  Surgeon: Ramiro Fitzpatrick MD;  Location: [facility];   "Service: Robotics - [facility];  Laterality: Bilateral;           Social History     Socioeconomic History   • Marital status:    Tobacco Use   • Smoking status: Never   • Smokeless tobacco: Current     Types: Snuff   Vaping Use   • Vaping Use: Never used   Substance and Sexual Activity   • Alcohol use: No   • Drug use: No   • Sexual activity: Defer           Family History   Problem Relation Age of Onset   • Cancer Mother          Current Outpatient Medications   Medication Instructions   • acetaminophen (TYLENOL) 650 mg, Oral, Every 4 Hours PRN   • cefdinir (OMNICEF) 300 mg, Oral, 2 Times Daily   • ibuprofen (ADVIL,MOTRIN) 600 mg, Oral, Every 6 Hours PRN   • metroNIDAZOLE (FLAGYL) 500 mg, Oral, 3 Times Daily   • ondansetron ODT (ZOFRAN-ODT) 4 mg, Translingual, Every 8 Hours PRN   • traMADol (ULTRAM) 50 mg, Oral, Every 8 Hours PRN   • valsartan-hydrochlorothiazide (DIOVAN-HCT) 320-25 MG per tablet No dose, route, or frequency recorded.         Allergies:  Patient has no known allergies.          PHYSICAL EXAM:     Patient Vitals for the past 24 hrs:   BP Temp Pulse Resp SpO2 Height Weight   04/28/23 2100 124/65 -- 86 18 97 % -- --   04/28/23 2058 -- 97.1 °F (36.2 °C) -- -- -- 182.9 cm (72\") 120 kg (264 lb)       Physical Exam  Vitals and nursing note reviewed.   Constitutional:       General: He is not in acute distress.     Appearance: Normal appearance.   HENT:      Head: Normocephalic and atraumatic.   Eyes:      Extraocular Movements: Extraocular movements intact.      Conjunctiva/sclera: Conjunctivae normal.   Pulmonary:      Effort: Pulmonary effort is normal. No respiratory distress.   Abdominal:      General: Abdomen is flat. There is no distension.      Palpations: Abdomen is soft.      Tenderness: There is no abdominal tenderness. There is no guarding or rebound.   Musculoskeletal:         General: No deformity. Normal range of motion.      Cervical back: Normal range of motion. No rigidity.   Skin:   "   Coloration: Skin is not jaundiced.      Findings: No rash.   Neurological:      General: No focal deficit present.      Mental Status: He is alert and oriented to person, place, and time.   Psychiatric:         Mood and Affect: Mood normal.         Behavior: Behavior normal.             COMPLETED DIAGNOSTIC STUDIES AND INTERVENTIONS:     Lab Results (last 24 hours)     Procedure Component Value Units Date/Time    Comprehensive Metabolic Panel [[id number]]  (Abnormal) Collected: 04/28/23 2114    Specimen: Blood from Arm, Right Updated: 04/28/23 2139     Glucose 109 mg/dL      BUN 34 mg/dL      Creatinine 1.33 mg/dL      Sodium 140 mmol/L      Potassium 3.5 mmol/L      Comment: Slight hemolysis detected by analyzer. Results may be affected.        Chloride 103 mmol/L      CO2 22.1 mmol/L      Calcium 9.0 mg/dL      Total Protein 7.8 g/dL      Albumin 4.2 g/dL      ALT (SGPT) 80 U/L      AST (SGOT) 26 U/L      Alkaline Phosphatase 115 U/L      Total Bilirubin 0.8 mg/dL      Globulin 3.6 gm/dL      A/G Ratio 1.2 g/dL      BUN/Creatinine Ratio 25.6     Anion Gap 14.9 mmol/L      eGFR 60.1 mL/min/1.73     Narrative:      GFR Normal >60  Chronic Kidney Disease <60  Kidney Failure <15      Lipase [[id number]]  (Normal) Collected: 04/28/23 2114    Specimen: Blood from Arm, Right Updated: 04/28/23 2139     Lipase 40 U/L     CBC & Differential [[id number]]  (Abnormal) Collected: 04/28/23 2259    Specimen: Blood Updated: 04/28/23 2303    Narrative:      The following orders were created for panel order CBC & Differential.  Procedure                               Abnormality         Status                     ---------                               -----------         ------                     CBC Auto Differential[[id number]]        Abnormal            Final result                 Please view results for these tests on the individual orders.    Lactic Acid, Plasma [[id number]]  (Abnormal) Collected: 04/28/23 2259    Specimen:  Blood Updated: 04/28/23 2321     Lactate 2.2 mmol/L     CBC Auto Differential [[id number]]  (Abnormal) Collected: 04/28/23 2259    Specimen: Blood Updated: 04/28/23 2303     WBC 13.50 10*3/mm3      RBC 5.14 10*6/mm3      Hemoglobin 16.3 g/dL      Hematocrit 47.9 %      MCV 93.2 fL      MCH 31.7 pg      MCHC 34.0 g/dL      RDW 12.5 %      RDW-SD 42.9 fl      MPV 10.3 fL      Platelets 285 10*3/mm3      Neutrophil % 78.5 %      Lymphocyte % 11.9 %      Monocyte % 8.1 %      Eosinophil % 0.9 %      Basophil % 0.3 %      Immature Grans % 0.3 %      Neutrophils, Absolute 10.60 10*3/mm3      Lymphocytes, Absolute 1.61 10*3/mm3      Monocytes, Absolute 1.09 10*3/mm3      Eosinophils, Absolute 0.12 10*3/mm3      Basophils, Absolute 0.04 10*3/mm3      Immature Grans, Absolute 0.04 10*3/mm3      nRBC 0.0 /100 WBC     Protime-INR [[id number]]  (Abnormal) Collected: 04/28/23 2321    Specimen: Blood from Arm, Left Updated: 04/28/23 2338     Protime 15.1 Seconds      INR 1.13    Narrative:      Suggested INR therapeutic range for stable oral anticoagulant therapy:    Low Intensity therapy:   1.5-2.0  Moderate Intensity therapy:   2.0-3.0  High Intensity therapy:   2.5-4.0            CT Abdomen Pelvis With Contrast   Final Result   Sigmoid diverticulitis. Focal contained perforation. No   drainable abscess identified. Additional findings as above.       This report was finalized on 4/29/2023 12:37 AM by Alex Pallas, DO.          XR Abdomen 2+ VW with Chest 1 VW   Final Result       1.  Mild central pulmonary vascular congestion.   2.  No edema or pneumonia.   3.  Normal heart size.   4.  Air-fluid levels in small bowel loops in the right and left abdomen   possibly related to underlying partial small bowel obstruction versus   ileus.   5.  No free air.   6.  No foreign body.   7.  Degenerative changes in the lumbar spine.       This report was finalized on 4/28/2023 9:58 PM by Javon Vera MD.                New Medications  Ordered This Visit   Medications   • sodium chloride 0.9 % flush 10 mL   • sodium chloride 0.9 % bolus 1,000 mL   • ondansetron (ZOFRAN) injection 4 mg   • iopamidol (ISOVUE-300) 61 % injection 100 mL   • cefTRIAXone (ROCEPHIN) 2 g in sodium chloride 0.9 % 100 mL IVPB-VTB   • metroNIDAZOLE (FLAGYL) IVPB 500 mg   • cefdinir (OMNICEF) 300 MG capsule     Sig: Take 1 capsule by mouth 2 (Two) Times a Day for 7 days.     Dispense:  14 capsule     Refill:  0   • metroNIDAZOLE (FLAGYL) 500 MG tablet     Sig: Take 1 tablet by mouth 3 (Three) Times a Day for 7 days.     Dispense:  21 tablet     Refill:  0   • ondansetron ODT (ZOFRAN-ODT) 4 MG disintegrating tablet     Sig: Place 1 tablet on the tongue Every 8 (Eight) Hours As Needed for Nausea or Vomiting for up to 15 doses.     Dispense:  15 tablet     Refill:  0   • ondansetron (ZOFRAN) injection 4 mg   • ondansetron ODT (ZOFRAN-ODT) disintegrating tablet 4 mg     TO GO         Procedures            MEDICAL DECISION-MAKING AND ED COURSE:     ED Course as of 04/29/23 0212 Fri Apr 28, 2023 2238 63-year-old male presents with 6 days of nausea vomiting diarrhea onset around the same time as taking his third Ozempic shot.  Patient well-appearing at time of interview, abdominal exam soft, nontender and reassuring.  X-ray shows evidence of partial SBO versus ileus.  CT order, Zofran and 1 L NS IVF.  Patient has not had stool sample in the emergency department for testing.  Last stool prior to arrival. [KP]   2314 WBC(!): 13.50 [KP]   2314 Hemoglobin: 16.3 [KP]   2327 Lactate(!!): 2.2 [KP]   2359 INR(!): 1.13 [KP]   Sat Apr 29, 2023   0050 CT Abdomen Pelvis With Contrast  Sigmoid diverticulitis. Focal contained perforation. No  drainable abscess identified. Additional findings as above. [KP]   0051 We will initiate ceftriaxone and metronidazole.  Elevated white count but no other SIRS criteria, not septic. [KP]   0211 Discussed findings with patient.  Patient not septic,  reports feeling significantly better, feels comfortable with plan of outpatient antibiotic trial and follow-up with primary care and general surgeon Dr. Wilcox.  Initiate cefdinir and metronidazole for 7 days.  All questions answered. [KP]      ED Course User Index  [KP] Uri Ortega MD       ?      FINAL IMPRESSION:       1. Perforation of small intestine due to diverticulitis         The complaints listed here are new problems to this examiner.       Medication List      New Prescriptions    cefdinir 300 MG capsule  Commonly known as: OMNICEF  Take 1 capsule by mouth 2 (Two) Times a Day for 7 days.     metroNIDAZOLE 500 MG tablet  Commonly known as: FLAGYL  Take 1 tablet by mouth 3 (Three) Times a Day for 7 days.     ondansetron ODT 4 MG disintegrating tablet  Commonly known as: ZOFRAN-ODT  Place 1 tablet on the tongue Every 8 (Eight) Hours As Needed for Nausea or Vomiting for up to 15 doses.           Where to Get Your Medications      These medications were sent to [facility] [address] [phone] [place] [id number] [address] [address]    Phone: [phone]   · cefdinir 300 MG capsule  · metroNIDAZOLE 500 MG tablet  · ondansetron ODT 4 MG disintegrating tablet         FOLLOW-UP  Jose Pettit MD  [address] [place] [address] [address] [place] [address] [phone]    Schedule an appointment as soon as possible for a visit       Miguel Wilcox MD  [address] [place] [id number]  [phone]    Schedule an appointment as soon as possible for a visit       [facility] Emergency Department  [address] [place] [id number] [phone]    If symptoms worsen      DISPOSITION  ED Disposition     ED Disposition   Discharge    Condition   Stable    Comment   --                 Please note that portions of this note were completed with a voice recognition program.      Uri Ortega MD  02:12  EDT  4/29/2023             Uri Ortega MD  04/29/23 0212       Uri Ortega MD  04/29/23 0213

## 2023-04-29 NOTE — DISCHARGE INSTRUCTIONS
Please follow-up with your primary care provider as soon as possible and general surgeon Dr. Wilcox next week.  Please return here immediately for any new onset or worsening symptoms.

## 2023-04-29 NOTE — ED NOTES
Began to start sticking pt for blood. He got hot and felt like he was going to pass out. I got a green to and an sst. Kel BRANDT notified. Pt currently sitting with me in 3rd triage. I provided him with 2 cool rags on the nape of his neck as well as his forehead.

## (undated) DEVICE — VIOLET BRAIDED (POLYGLACTIN 910), SYNTHETIC ABSORBABLE SUTURE: Brand: COATED VICRYL

## (undated) DEVICE — LARYNG GLIDESCOPE LOPRO SPECTR SU S3

## (undated) DEVICE — BLADELESS OBTURATOR: Brand: WECK VISTA

## (undated) DEVICE — GLV SURG PREMIERPRO MIC LTX PF SZ7.5 BRN

## (undated) DEVICE — 40595 XL TRENDELENBURG POSITIONING KIT: Brand: 40595 XL TRENDELENBURG POSITIONING KIT

## (undated) DEVICE — ARM DRAPE

## (undated) DEVICE — APPL CHLORAPREP HI/LITE 26ML ORNG

## (undated) DEVICE — HOLDER: Brand: DEROYAL

## (undated) DEVICE — PK LAP GEN 70

## (undated) DEVICE — INSUFFLATION NEEDLE TO ESTABLISH PNEUMOPERITONEUM.: Brand: INSUFFLATION NEEDLE

## (undated) DEVICE — COVER,MAYO STAND,STERILE: Brand: MEDLINE

## (undated) DEVICE — TIP COVER ACCESSORY

## (undated) DEVICE — MARKR SKIN W/RULR AND LBL

## (undated) DEVICE — ADHS SKIN PREMIERPRO EXOFIN TOPICAL HI/VISC .5ML

## (undated) DEVICE — PERMANENT CAUTERY HOOK: Brand: ENDOWRIST

## (undated) DEVICE — DRAPE,UTILTY,TAPE,15X26, 4EA/PK: Brand: MEDLINE

## (undated) DEVICE — TOTAL TRAY, 16FR 10ML SIL FOLEY, URN: Brand: MEDLINE

## (undated) DEVICE — LARGE NEEDLE DRIVER: Brand: ENDOWRIST

## (undated) DEVICE — SUT MNCRYL PLS ANTIB UD 4/0 PS2 18IN

## (undated) DEVICE — UNDERGLV SURG BIOGEL INDICAT PF 8 GRN

## (undated) DEVICE — ST TBG PNEUMOCLEAR EVAC SMOKE HIFLO

## (undated) DEVICE — SUT VIC 0/0 UR6 27IN DYED J603H

## (undated) DEVICE — MONOPOLAR CURVED SCISSORS: Brand: ENDOWRIST

## (undated) DEVICE — FENESTRATED BIPOLAR FORCEPS: Brand: ENDOWRIST

## (undated) DEVICE — CANNULA SEAL

## (undated) DEVICE — CVR DISP HUG U VAC STEEP TREND

## (undated) DEVICE — PATIENT RETURN ELECTRODE, SINGLE-USE, CONTACT QUALITY MONITORING, ADULT, WITH 9FT CORD, FOR PATIENTS WEIGING OVER 33LBS. (15KG): Brand: MEGADYNE

## (undated) DEVICE — HDRST POSTIN FM CRDL TRACH SLOT 9X8X4IN

## (undated) DEVICE — PREMIUM WET SKIN PREP TRAY: Brand: MEDLINE INDUSTRIES, INC.